# Patient Record
Sex: FEMALE | Race: OTHER | HISPANIC OR LATINO | Employment: UNEMPLOYED | URBAN - METROPOLITAN AREA
[De-identification: names, ages, dates, MRNs, and addresses within clinical notes are randomized per-mention and may not be internally consistent; named-entity substitution may affect disease eponyms.]

---

## 2018-01-15 ENCOUNTER — HOSPITAL ENCOUNTER (EMERGENCY)
Facility: HOSPITAL | Age: 33
Discharge: HOME | End: 2018-01-15
Attending: EMERGENCY MEDICINE
Payer: MEDICAID

## 2018-01-15 VITALS
WEIGHT: 168 LBS | HEIGHT: 63 IN | HEART RATE: 83 BPM | OXYGEN SATURATION: 100 % | TEMPERATURE: 98 F | SYSTOLIC BLOOD PRESSURE: 126 MMHG | RESPIRATION RATE: 20 BRPM | BODY MASS INDEX: 29.77 KG/M2 | DIASTOLIC BLOOD PRESSURE: 88 MMHG

## 2018-01-15 DIAGNOSIS — Z34.90 PREGNANCY, UNSPECIFIED GESTATIONAL AGE: ICD-10-CM

## 2018-01-15 DIAGNOSIS — J06.9 VIRAL URI: ICD-10-CM

## 2018-01-15 DIAGNOSIS — N30.00 ACUTE CYSTITIS WITHOUT HEMATURIA: Primary | ICD-10-CM

## 2018-01-15 LAB
B-HCG UR QL: POSITIVE
BACTERIA #/AREA URNS HPF: ABNORMAL /HPF
BILIRUB UR QL STRIP: NEGATIVE
CLARITY UR: ABNORMAL
COLOR UR: YELLOW
CTP QC/QA: YES
DEPRECATED S PYO AG THROAT QL EIA: NEGATIVE
FLUAV AG SPEC QL IA: NEGATIVE
FLUBV AG SPEC QL IA: NEGATIVE
GLUCOSE UR QL STRIP: NEGATIVE
HGB UR QL STRIP: ABNORMAL
KETONES UR QL STRIP: ABNORMAL
LEUKOCYTE ESTERASE UR QL STRIP: ABNORMAL
MICROSCOPIC COMMENT: ABNORMAL
NITRITE UR QL STRIP: NEGATIVE
PH UR STRIP: 6 [PH] (ref 5–8)
PROT UR QL STRIP: NEGATIVE
RBC #/AREA URNS HPF: 1 /HPF (ref 0–4)
SP GR UR STRIP: 1.02 (ref 1–1.03)
SPECIMEN SOURCE: NORMAL
SQUAMOUS #/AREA URNS HPF: 3 /HPF
URN SPEC COLLECT METH UR: ABNORMAL
UROBILINOGEN UR STRIP-ACNC: NEGATIVE EU/DL
WBC #/AREA URNS HPF: 5 /HPF (ref 0–5)

## 2018-01-15 PROCEDURE — 81025 URINE PREGNANCY TEST: CPT | Performed by: PHYSICIAN ASSISTANT

## 2018-01-15 PROCEDURE — 81000 URINALYSIS NONAUTO W/SCOPE: CPT

## 2018-01-15 PROCEDURE — 87400 INFLUENZA A/B EACH AG IA: CPT | Mod: 59

## 2018-01-15 PROCEDURE — 87880 STREP A ASSAY W/OPTIC: CPT

## 2018-01-15 PROCEDURE — 87147 CULTURE TYPE IMMUNOLOGIC: CPT

## 2018-01-15 PROCEDURE — 87081 CULTURE SCREEN ONLY: CPT

## 2018-01-15 PROCEDURE — 99283 EMERGENCY DEPT VISIT LOW MDM: CPT

## 2018-01-15 PROCEDURE — 87086 URINE CULTURE/COLONY COUNT: CPT

## 2018-01-15 PROCEDURE — 25000003 PHARM REV CODE 250: Performed by: PHYSICIAN ASSISTANT

## 2018-01-15 RX ORDER — NITROFURANTOIN 25; 75 MG/1; MG/1
100 CAPSULE ORAL 2 TIMES DAILY
Qty: 10 CAPSULE | Refills: 0 | Status: SHIPPED | OUTPATIENT
Start: 2018-01-15 | End: 2018-01-20

## 2018-01-15 RX ORDER — ACETAMINOPHEN 500 MG
1000 TABLET ORAL
Status: COMPLETED | OUTPATIENT
Start: 2018-01-15 | End: 2018-01-15

## 2018-01-15 RX ADMIN — ACETAMINOPHEN 1000 MG: 500 TABLET, FILM COATED ORAL at 05:01

## 2018-01-15 NOTE — ED PROVIDER NOTES
Encounter Date: 1/15/2018    SCRIBE #1 NOTE: IGabbie, am scribing for, and in the presence of, Radha Stallings PA-C.       History     Chief Complaint   Patient presents with    Fever     3 months pregnant     Cough    Chills    Sore Throat       01/15/2018 4:57 PM     Chief complaint: Fever, back pain      Tresa Cavazos is a 32 y.o. female with a history of miscarraige who presents to the ED complaining of left-sided back pain that radiates to her left side x4 days. She also complains of cough, rhinorrhea, congestion, sore throat, chills, and fever x4 days. She denies vaginal bleeding, vaginal discharge, dysuria, and all other symptoms at this time. The patient is currently about 2 months pregnant, and she has not taken any medication to treat her symptoms. She has a history of A3. There is no SHx noted.       The history is provided by a relative. A  was used.     Review of patient's allergies indicates:  No Known Allergies  History reviewed. No pertinent past medical history.  No past surgical history on file.  History reviewed. No pertinent family history.  Social History   Substance Use Topics    Smoking status: Not on file    Smokeless tobacco: Not on file    Alcohol use Not on file     Review of Systems   Constitutional: Positive for chills and fever.   HENT: Positive for congestion, rhinorrhea and sore throat. Negative for facial swelling and trouble swallowing.    Eyes: Negative for discharge.   Respiratory: Positive for cough. Negative for chest tightness, shortness of breath and wheezing.    Cardiovascular: Negative for chest pain and palpitations.   Gastrointestinal: Negative for abdominal pain, diarrhea, nausea and vomiting.   Genitourinary: Negative for dysuria, hematuria, vaginal bleeding, vaginal discharge and vaginal pain.   Musculoskeletal: Positive for back pain. Negative for arthralgias, joint swelling, myalgias, neck pain and neck stiffness.   Skin:  Negative for color change, pallor, rash and wound.   Neurological: Negative for dizziness, syncope, weakness, light-headedness, numbness and headaches.   Hematological: Does not bruise/bleed easily.   Psychiatric/Behavioral: The patient is not nervous/anxious.        Physical Exam     Initial Vitals [01/15/18 1636]   BP Pulse Resp Temp SpO2   126/88 83 20 98 °F (36.7 °C) 100 %      MAP       100.67         Physical Exam    Nursing note and vitals reviewed.  Constitutional: She appears well-developed and well-nourished. She is not diaphoretic. No distress.   HENT:   Head: Normocephalic and atraumatic.   Right Ear: External ear normal.   Left Ear: External ear normal.   Nasal congestion and rhinorrhea noted.  Mild erythema noted to posterior oropharynx without edema or exudate.    Eyes: Conjunctivae are normal.   Neck: Normal range of motion. Neck supple.   Cardiovascular: Normal rate, regular rhythm, normal heart sounds and intact distal pulses.   No murmur heard.  Pulmonary/Chest: Breath sounds normal. No respiratory distress. She has no wheezes. She has no rhonchi. She has no rales.   Equal, bilateral breath sounds noted without wheezing.      Abdominal: Soft. She exhibits no distension and no mass. There is no tenderness.   No palpable abdominal tenderness noted. No CVA tenderness.    Musculoskeletal: Normal range of motion. She exhibits no edema or tenderness.   No palpable tenderness to lumbar spine.     Lymphadenopathy:     She has no cervical adenopathy.   Neurological: She is alert and oriented to person, place, and time. She has normal strength. No sensory deficit.   Skin: Skin is warm and dry. No rash and no abscess noted. No erythema.   Psychiatric: She has a normal mood and affect.         ED Course   Procedures  Labs Reviewed   URINALYSIS - Abnormal; Notable for the following:        Result Value    Appearance, UA Hazy (*)     Ketones, UA 1+ (*)     Occult Blood UA Trace (*)     Leukocytes, UA Trace (*)      All other components within normal limits   URINALYSIS MICROSCOPIC - Abnormal; Notable for the following:     Bacteria, UA Moderate (*)     All other components within normal limits   POCT URINE PREGNANCY - Abnormal; Notable for the following:     POC Preg Test, Ur Positive (*)     All other components within normal limits   THROAT SCREEN, RAPID   CULTURE, STREP A,  THROAT   CULTURE, URINE   INFLUENZA A AND B ANTIGEN             Medical Decision Making:   History:   Old Medical Records: I decided to obtain old medical records.  Differential Diagnosis:   Influenza  Pneumonia  Strep pharyngitis  Meningitis  Viral syndrome  Miscarriage  UTI    Clinical Tests:   Lab Tests: Ordered and Reviewed       APC / Resident Notes:   Influenza and Rapid strep are negative.  Urinalysis does show evidence for infection, which we will treat with Macrobid.  Urine culture will be sent.  She repeatedly denies vaginal discharge and vaginal bleeding.  No abdominal pain, pelvic pain or cramping.  No need for further evaluation or testing at this time.  She is encouraged to follow-up with her OB/GYN.  Her other symptoms are likely viral.  She is discharged home and given specific return precautions.        Scribe Attestation:   Scribe #1: I performed the above scribed service and the documentation accurately describes the services I performed. I attest to the accuracy of the note.    I, Radha Stallings PA-C, personally performed the services described in this documentation. All medical record entries made by the scribe were at my direction and in my presence.  I have reviewed the chart and agree that the record reflects my personal performance and is accurate and complete. Radha Stallings PA-C.  1:14 AM 01/16/2018          ED Course      Clinical Impression:   The primary encounter diagnosis was Acute cystitis without hematuria. Diagnoses of Viral URI and Pregnancy, unspecified gestational age were also pertinent to this  visit.    Disposition:   Disposition: Discharged  Condition: Stable                        Radha Stallings PA-C  01/16/18 0114

## 2018-01-16 NOTE — ED NOTES
Pt presents to ED with c/o left lower back pain that radiates around her side, cough, congestion, fever, rhinorrhea, and generalized body aches that began four days ago. Pt is AAOx4. Skin warm, dry to touch. Respirations even, nonlabored. NAD noted. VSS. Breath sounds clear, equal bilaterally. Mucous membranes pink, moist. Pt denies vaginal bleeding or vaginal discharge. Denies pain at this time. Pt is 3 months pregnant.

## 2018-01-17 LAB — BACTERIA UR CULT: NORMAL

## 2018-01-18 LAB
BACTERIA THROAT CULT: NORMAL
BACTERIA THROAT CULT: NORMAL

## 2019-07-29 ENCOUNTER — LAB VISIT (OUTPATIENT)
Dept: LAB | Facility: HOSPITAL | Age: 34
End: 2019-07-29
Attending: SPECIALIST
Payer: MEDICAID

## 2019-07-29 DIAGNOSIS — Z34.81 PRENATAL CARE, SUBSEQUENT PREGNANCY, FIRST TRIMESTER: Primary | ICD-10-CM

## 2019-07-29 LAB
BASOPHILS # BLD AUTO: 0.03 K/UL (ref 0–0.2)
BASOPHILS NFR BLD: 0.3 % (ref 0–1.9)
DIFFERENTIAL METHOD: ABNORMAL
EOSINOPHIL # BLD AUTO: 0.4 K/UL (ref 0–0.5)
EOSINOPHIL NFR BLD: 4.3 % (ref 0–8)
ERYTHROCYTE [DISTWIDTH] IN BLOOD BY AUTOMATED COUNT: 13.4 % (ref 11.5–14.5)
HCG INTACT+B SERPL-ACNC: NORMAL MIU/ML
HCT VFR BLD AUTO: 36.1 % (ref 37–48.5)
HGB BLD-MCNC: 12 G/DL (ref 12–16)
IMM GRANULOCYTES # BLD AUTO: 0.01 K/UL (ref 0–0.04)
LYMPHOCYTES # BLD AUTO: 2.8 K/UL (ref 1–4.8)
LYMPHOCYTES NFR BLD: 30 % (ref 18–48)
MCH RBC QN AUTO: 28.6 PG (ref 27–31)
MCHC RBC AUTO-ENTMCNC: 33.2 G/DL (ref 32–36)
MCV RBC AUTO: 86 FL (ref 82–98)
MONOCYTES # BLD AUTO: 0.5 K/UL (ref 0.3–1)
MONOCYTES NFR BLD: 5.4 % (ref 4–15)
NEUTROPHILS # BLD AUTO: 5.5 K/UL (ref 1.8–7.7)
NEUTROPHILS NFR BLD: 59.9 % (ref 38–73)
NRBC BLD-RTO: 0 /100 WBC
PLATELET # BLD AUTO: 266 K/UL (ref 150–350)
PMV BLD AUTO: 9.5 FL (ref 9.2–12.9)
RBC # BLD AUTO: 4.2 M/UL (ref 4–5.4)
WBC # BLD AUTO: 9.22 K/UL (ref 3.9–12.7)

## 2019-07-29 PROCEDURE — 84702 CHORIONIC GONADOTROPIN TEST: CPT

## 2019-07-29 PROCEDURE — 36415 COLL VENOUS BLD VENIPUNCTURE: CPT

## 2019-07-29 PROCEDURE — 84144 ASSAY OF PROGESTERONE: CPT

## 2019-07-29 PROCEDURE — 85025 COMPLETE CBC W/AUTO DIFF WBC: CPT

## 2019-07-30 LAB — PROGEST SERPL-MCNC: 29.2 NG/ML

## 2019-08-01 LAB
ABO + RH BLD: NORMAL
HBV SURFACE AG SERPL QL IA: NEGATIVE
HCT VFR BLD AUTO: 36 % (ref 36–46)
HGB BLD-MCNC: 12 G/DL (ref 12–16)
HIV 1+2 AB+HIV1 P24 AG SERPL QL IA: NORMAL
INDIRECT COOMBS: NORMAL
RPR: NORMAL
RUBELLA IMMUNE STATUS: NORMAL
TSH SERPL DL<=0.005 MIU/L-ACNC: 2.26 UIU/ML (ref 0.41–5.9)

## 2019-08-29 LAB
C TRACH RRNA SPEC QL PROBE: NORMAL
N GONORRHOEAE, AMPLIFIED DNA: NORMAL

## 2019-12-27 LAB — PRENATAL STREP B CULTURE: NORMAL

## 2020-01-16 ENCOUNTER — HOSPITAL ENCOUNTER (INPATIENT)
Facility: HOSPITAL | Age: 35
LOS: 2 days | Discharge: HOME | End: 2020-01-18
Attending: SPECIALIST | Admitting: SPECIALIST
Payer: MEDICAID

## 2020-01-16 ENCOUNTER — ANESTHESIA EVENT (OUTPATIENT)
Dept: OBSTETRICS AND GYNECOLOGY | Facility: HOSPITAL | Age: 35
End: 2020-01-16
Payer: MEDICAID

## 2020-01-16 ENCOUNTER — ANESTHESIA (OUTPATIENT)
Dept: OBSTETRICS AND GYNECOLOGY | Facility: HOSPITAL | Age: 35
End: 2020-01-16
Payer: MEDICAID

## 2020-01-16 DIAGNOSIS — Z34.90 ENCOUNTER FOR INDUCTION OF LABOR: ICD-10-CM

## 2020-01-16 LAB
ABO + RH BLD: NORMAL
ALBUMIN SERPL BCP-MCNC: 3 G/DL (ref 3.5–5.2)
ALP SERPL-CCNC: 112 U/L (ref 55–135)
ALT SERPL W/O P-5'-P-CCNC: 13 U/L (ref 10–44)
AMPHET+METHAMPHET UR QL: NEGATIVE
ANION GAP SERPL CALC-SCNC: 12 MMOL/L (ref 8–16)
AST SERPL-CCNC: 24 U/L (ref 10–40)
BACTERIA #/AREA URNS HPF: ABNORMAL /HPF
BARBITURATES UR QL SCN>200 NG/ML: NEGATIVE
BASOPHILS # BLD AUTO: 0.03 K/UL (ref 0–0.2)
BASOPHILS NFR BLD: 0.3 % (ref 0–1.9)
BENZODIAZ UR QL SCN>200 NG/ML: NEGATIVE
BILIRUB SERPL-MCNC: 0.6 MG/DL (ref 0.1–1)
BILIRUB UR QL STRIP: NEGATIVE
BLD GP AB SCN CELLS X3 SERPL QL: NORMAL
BUN SERPL-MCNC: 10 MG/DL (ref 6–20)
BZE UR QL SCN: NEGATIVE
CALCIUM SERPL-MCNC: 9.4 MG/DL (ref 8.7–10.5)
CANNABINOIDS UR QL SCN: NEGATIVE
CHLORIDE SERPL-SCNC: 105 MMOL/L (ref 95–110)
CLARITY UR: CLEAR
CO2 SERPL-SCNC: 19 MMOL/L (ref 23–29)
COLOR UR: YELLOW
CREAT SERPL-MCNC: 0.4 MG/DL (ref 0.5–1.4)
CREAT UR-MCNC: 185 MG/DL (ref 15–325)
DIFFERENTIAL METHOD: NORMAL
EOSINOPHIL # BLD AUTO: 0.2 K/UL (ref 0–0.5)
EOSINOPHIL NFR BLD: 2.3 % (ref 0–8)
ERYTHROCYTE [DISTWIDTH] IN BLOOD BY AUTOMATED COUNT: 13.1 % (ref 11.5–14.5)
EST. GFR  (AFRICAN AMERICAN): >60 ML/MIN/1.73 M^2
EST. GFR  (NON AFRICAN AMERICAN): >60 ML/MIN/1.73 M^2
GLUCOSE SERPL-MCNC: 87 MG/DL (ref 70–110)
GLUCOSE UR QL STRIP: NEGATIVE
HCT VFR BLD AUTO: 37.4 % (ref 37–48.5)
HGB BLD-MCNC: 12.7 G/DL (ref 12–16)
HGB UR QL STRIP: NEGATIVE
IMM GRANULOCYTES # BLD AUTO: 0.02 K/UL (ref 0–0.04)
IMM GRANULOCYTES NFR BLD AUTO: 0.2 % (ref 0–0.5)
KETONES UR QL STRIP: NEGATIVE
LEUKOCYTE ESTERASE UR QL STRIP: NEGATIVE
LYMPHOCYTES # BLD AUTO: 2.8 K/UL (ref 1–4.8)
LYMPHOCYTES NFR BLD: 32 % (ref 18–48)
MCH RBC QN AUTO: 30 PG (ref 27–31)
MCHC RBC AUTO-ENTMCNC: 34 G/DL (ref 32–36)
MCV RBC AUTO: 88 FL (ref 82–98)
MICROSCOPIC COMMENT: ABNORMAL
MONOCYTES # BLD AUTO: 0.6 K/UL (ref 0.3–1)
MONOCYTES NFR BLD: 6.3 % (ref 4–15)
NEUTROPHILS # BLD AUTO: 5.1 K/UL (ref 1.8–7.7)
NEUTROPHILS NFR BLD: 58.9 % (ref 38–73)
NITRITE UR QL STRIP: NEGATIVE
NRBC BLD-RTO: 0 /100 WBC
OPIATES UR QL SCN: NEGATIVE
PCP UR QL SCN>25 NG/ML: NEGATIVE
PCP UR QL SCN>25 NG/ML: NEGATIVE
PH UR STRIP: 7 [PH] (ref 5–8)
PLATELET # BLD AUTO: 226 K/UL (ref 150–350)
PMV BLD AUTO: 10.3 FL (ref 9.2–12.9)
POTASSIUM SERPL-SCNC: 3.5 MMOL/L (ref 3.5–5.1)
PROT SERPL-MCNC: 6.8 G/DL (ref 6–8.4)
PROT UR QL STRIP: ABNORMAL
RBC # BLD AUTO: 4.23 M/UL (ref 4–5.4)
RBC #/AREA URNS HPF: 3 /HPF (ref 0–4)
RPR SER QL: NORMAL
SODIUM SERPL-SCNC: 136 MMOL/L (ref 136–145)
SP GR UR STRIP: 1.02 (ref 1–1.03)
SQUAMOUS #/AREA URNS HPF: 50 /HPF
TOXICOLOGY INFORMATION: NORMAL
URN SPEC COLLECT METH UR: ABNORMAL
UROBILINOGEN UR STRIP-ACNC: 1 EU/DL
WBC # BLD AUTO: 8.73 K/UL (ref 3.9–12.7)
WBC #/AREA URNS HPF: 16 /HPF (ref 0–5)

## 2020-01-16 PROCEDURE — C1751 CATH, INF, PER/CENT/MIDLINE: HCPCS | Performed by: ANESTHESIOLOGY

## 2020-01-16 PROCEDURE — 80307 DRUG TEST PRSMV CHEM ANLYZR: CPT

## 2020-01-16 PROCEDURE — 80053 COMPREHEN METABOLIC PANEL: CPT

## 2020-01-16 PROCEDURE — 86901 BLOOD TYPING SEROLOGIC RH(D): CPT

## 2020-01-16 PROCEDURE — 12000002 HC ACUTE/MED SURGE SEMI-PRIVATE ROOM

## 2020-01-16 PROCEDURE — 87086 URINE CULTURE/COLONY COUNT: CPT

## 2020-01-16 PROCEDURE — 81001 URINALYSIS AUTO W/SCOPE: CPT

## 2020-01-16 PROCEDURE — 72200004 HC VAGINAL DELIVERY LEVEL I

## 2020-01-16 PROCEDURE — 63600175 PHARM REV CODE 636 W HCPCS: Performed by: ANESTHESIOLOGY

## 2020-01-16 PROCEDURE — 85025 COMPLETE CBC W/AUTO DIFF WBC: CPT

## 2020-01-16 PROCEDURE — 63600175 PHARM REV CODE 636 W HCPCS: Performed by: SPECIALIST

## 2020-01-16 PROCEDURE — 62326 NJX INTERLAMINAR LMBR/SAC: CPT | Performed by: ANESTHESIOLOGY

## 2020-01-16 PROCEDURE — 86592 SYPHILIS TEST NON-TREP QUAL: CPT

## 2020-01-16 RX ORDER — SIMETHICONE 80 MG
1 TABLET,CHEWABLE ORAL 4 TIMES DAILY PRN
Status: DISCONTINUED | OUTPATIENT
Start: 2020-01-16 | End: 2020-01-18 | Stop reason: HOSPADM

## 2020-01-16 RX ORDER — BUTORPHANOL TARTRATE 2 MG/ML
1 INJECTION INTRAMUSCULAR; INTRAVENOUS
Status: DISCONTINUED | OUTPATIENT
Start: 2020-01-16 | End: 2020-01-18 | Stop reason: HOSPADM

## 2020-01-16 RX ORDER — FENTANYL/BUPIVACAINE/NS/PF 2-625MCG/1
PLASTIC BAG, INJECTION (ML) INJECTION CONTINUOUS
Status: CANCELLED | OUTPATIENT
Start: 2020-01-16

## 2020-01-16 RX ORDER — OXYCODONE AND ACETAMINOPHEN 10; 325 MG/1; MG/1
1 TABLET ORAL EVERY 4 HOURS PRN
Status: DISCONTINUED | OUTPATIENT
Start: 2020-01-16 | End: 2020-01-18 | Stop reason: HOSPADM

## 2020-01-16 RX ORDER — PROMETHAZINE HYDROCHLORIDE 25 MG/1
25 TABLET ORAL EVERY 6 HOURS PRN
Status: CANCELLED | OUTPATIENT
Start: 2020-01-16

## 2020-01-16 RX ORDER — BUTORPHANOL TARTRATE 2 MG/ML
2 INJECTION INTRAMUSCULAR; INTRAVENOUS
Status: DISCONTINUED | OUTPATIENT
Start: 2020-01-16 | End: 2020-01-18 | Stop reason: HOSPADM

## 2020-01-16 RX ORDER — OXYCODONE AND ACETAMINOPHEN 5; 325 MG/1; MG/1
1 TABLET ORAL EVERY 4 HOURS PRN
Status: DISCONTINUED | OUTPATIENT
Start: 2020-01-16 | End: 2020-01-18 | Stop reason: HOSPADM

## 2020-01-16 RX ORDER — OXYTOCIN-SODIUM CHLORIDE 0.9% IV SOLN 30 UNIT/500ML 30-0.9/5 UT/ML-%
2 SOLUTION INTRAVENOUS CONTINUOUS
Status: DISCONTINUED | OUTPATIENT
Start: 2020-01-16 | End: 2020-01-18 | Stop reason: HOSPADM

## 2020-01-16 RX ORDER — SODIUM CHLORIDE, SODIUM LACTATE, POTASSIUM CHLORIDE, CALCIUM CHLORIDE 600; 310; 30; 20 MG/100ML; MG/100ML; MG/100ML; MG/100ML
INJECTION, SOLUTION INTRAVENOUS CONTINUOUS
Status: DISCONTINUED | OUTPATIENT
Start: 2020-01-16 | End: 2020-01-18 | Stop reason: HOSPADM

## 2020-01-16 RX ORDER — DIPHENHYDRAMINE HYDROCHLORIDE 50 MG/ML
12.5 INJECTION INTRAMUSCULAR; INTRAVENOUS EVERY 4 HOURS PRN
Status: CANCELLED | OUTPATIENT
Start: 2020-01-16

## 2020-01-16 RX ORDER — ROPIVACAINE HYDROCHLORIDE 2 MG/ML
INJECTION, SOLUTION EPIDURAL; INFILTRATION
Status: DISCONTINUED | OUTPATIENT
Start: 2020-01-16 | End: 2020-01-16

## 2020-01-16 RX ORDER — MISOPROSTOL 100 UG/1
600 TABLET ORAL
Status: DISCONTINUED | OUTPATIENT
Start: 2020-01-16 | End: 2020-01-18 | Stop reason: HOSPADM

## 2020-01-16 RX ORDER — ONDANSETRON 2 MG/ML
4 INJECTION INTRAMUSCULAR; INTRAVENOUS ONCE
Status: CANCELLED | OUTPATIENT
Start: 2020-01-16 | End: 2020-01-16

## 2020-01-16 RX ORDER — OXYTOCIN-SODIUM CHLORIDE 0.9% IV SOLN 30 UNIT/500ML 30-0.9/5 UT/ML-%
30 SOLUTION INTRAVENOUS ONCE
Status: DISCONTINUED | OUTPATIENT
Start: 2020-01-16 | End: 2020-01-18 | Stop reason: HOSPADM

## 2020-01-16 RX ORDER — EPHEDRINE SULFATE 50 MG/ML
5 INJECTION, SOLUTION INTRAVENOUS ONCE AS NEEDED
Status: CANCELLED | OUTPATIENT
Start: 2020-01-16 | End: 2031-06-14

## 2020-01-16 RX ORDER — ONDANSETRON 4 MG/1
8 TABLET, ORALLY DISINTEGRATING ORAL EVERY 8 HOURS PRN
Status: DISCONTINUED | OUTPATIENT
Start: 2020-01-16 | End: 2020-01-18 | Stop reason: HOSPADM

## 2020-01-16 RX ORDER — CALCIUM CARBONATE 200(500)MG
500 TABLET,CHEWABLE ORAL 3 TIMES DAILY PRN
Status: DISCONTINUED | OUTPATIENT
Start: 2020-01-16 | End: 2020-01-18 | Stop reason: HOSPADM

## 2020-01-16 RX ORDER — NALOXONE HCL 0.4 MG/ML
0.4 VIAL (ML) INJECTION SEE ADMIN INSTRUCTIONS
Status: CANCELLED | OUTPATIENT
Start: 2020-01-16

## 2020-01-16 RX ORDER — SODIUM CHLORIDE 9 MG/ML
INJECTION, SOLUTION INTRAVENOUS
Status: DISCONTINUED | OUTPATIENT
Start: 2020-01-16 | End: 2020-01-18 | Stop reason: HOSPADM

## 2020-01-16 RX ORDER — ONDANSETRON 4 MG/1
8 TABLET, ORALLY DISINTEGRATING ORAL EVERY 8 HOURS PRN
Status: CANCELLED | OUTPATIENT
Start: 2020-01-16

## 2020-01-16 RX ADMIN — ROPIVACAINE HYDROCHLORIDE 3 ML: 2 INJECTION, SOLUTION EPIDURAL; INFILTRATION at 08:01

## 2020-01-16 RX ADMIN — ROPIVACAINE HYDROCHLORIDE 4 ML: 2 INJECTION, SOLUTION EPIDURAL; INFILTRATION at 08:01

## 2020-01-16 RX ADMIN — SODIUM CHLORIDE, SODIUM LACTATE, POTASSIUM CHLORIDE, AND CALCIUM CHLORIDE: .6; .31; .03; .02 INJECTION, SOLUTION INTRAVENOUS at 06:01

## 2020-01-16 RX ADMIN — ROPIVACAINE HYDROCHLORIDE 3 ML: 2 INJECTION, SOLUTION EPIDURAL; INFILTRATION at 07:01

## 2020-01-16 RX ADMIN — Medication 1 MILLI-UNITS/MIN: at 07:01

## 2020-01-16 RX ADMIN — SODIUM CHLORIDE, SODIUM LACTATE, POTASSIUM CHLORIDE, AND CALCIUM CHLORIDE: .6; .31; .03; .02 INJECTION, SOLUTION INTRAVENOUS at 02:01

## 2020-01-16 NOTE — PLAN OF CARE
Plan of care reviewed with patient and family. Patient again states her desire to have a natural delivery. No questions at this time.

## 2020-01-17 LAB
BACTERIA UR CULT: NORMAL
BACTERIA UR CULT: NORMAL
BASOPHILS # BLD AUTO: 0.05 K/UL (ref 0–0.2)
BASOPHILS NFR BLD: 0.4 % (ref 0–1.9)
DIFFERENTIAL METHOD: ABNORMAL
EOSINOPHIL # BLD AUTO: 0.1 K/UL (ref 0–0.5)
EOSINOPHIL NFR BLD: 0.4 % (ref 0–8)
ERYTHROCYTE [DISTWIDTH] IN BLOOD BY AUTOMATED COUNT: 13.1 % (ref 11.5–14.5)
HCT VFR BLD AUTO: 35.6 % (ref 37–48.5)
HGB BLD-MCNC: 11.7 G/DL (ref 12–16)
IMM GRANULOCYTES # BLD AUTO: 0.05 K/UL (ref 0–0.04)
IMM GRANULOCYTES NFR BLD AUTO: 0.4 % (ref 0–0.5)
LYMPHOCYTES # BLD AUTO: 2.3 K/UL (ref 1–4.8)
LYMPHOCYTES NFR BLD: 17.6 % (ref 18–48)
MCH RBC QN AUTO: 29.2 PG (ref 27–31)
MCHC RBC AUTO-ENTMCNC: 32.9 G/DL (ref 32–36)
MCV RBC AUTO: 89 FL (ref 82–98)
MONOCYTES # BLD AUTO: 0.8 K/UL (ref 0.3–1)
MONOCYTES NFR BLD: 6.5 % (ref 4–15)
NEUTROPHILS # BLD AUTO: 9.6 K/UL (ref 1.8–7.7)
NEUTROPHILS NFR BLD: 74.7 % (ref 38–73)
NRBC BLD-RTO: 0 /100 WBC
PLATELET # BLD AUTO: 215 K/UL (ref 150–350)
PMV BLD AUTO: 10.6 FL (ref 9.2–12.9)
RBC # BLD AUTO: 4.01 M/UL (ref 4–5.4)
WBC # BLD AUTO: 12.82 K/UL (ref 3.9–12.7)

## 2020-01-17 PROCEDURE — 85025 COMPLETE CBC W/AUTO DIFF WBC: CPT

## 2020-01-17 PROCEDURE — 63600175 PHARM REV CODE 636 W HCPCS: Performed by: SPECIALIST

## 2020-01-17 PROCEDURE — 36415 COLL VENOUS BLD VENIPUNCTURE: CPT

## 2020-01-17 PROCEDURE — 12000002 HC ACUTE/MED SURGE SEMI-PRIVATE ROOM

## 2020-01-17 PROCEDURE — 25000003 PHARM REV CODE 250: Performed by: SPECIALIST

## 2020-01-17 RX ORDER — DIPHENHYDRAMINE HCL 25 MG
25 CAPSULE ORAL EVERY 4 HOURS PRN
Status: DISCONTINUED | OUTPATIENT
Start: 2020-01-17 | End: 2020-01-18 | Stop reason: HOSPADM

## 2020-01-17 RX ORDER — HYDROCORTISONE 25 MG/G
CREAM TOPICAL 3 TIMES DAILY PRN
Status: DISCONTINUED | OUTPATIENT
Start: 2020-01-17 | End: 2020-01-18 | Stop reason: HOSPADM

## 2020-01-17 RX ORDER — ADHESIVE BANDAGE
30 BANDAGE TOPICAL 2 TIMES DAILY PRN
Status: DISCONTINUED | OUTPATIENT
Start: 2020-01-17 | End: 2020-01-18 | Stop reason: HOSPADM

## 2020-01-17 RX ORDER — DOCUSATE SODIUM 100 MG/1
200 CAPSULE, LIQUID FILLED ORAL 2 TIMES DAILY PRN
Status: DISCONTINUED | OUTPATIENT
Start: 2020-01-17 | End: 2020-01-18 | Stop reason: HOSPADM

## 2020-01-17 RX ORDER — DIPHENHYDRAMINE HYDROCHLORIDE 50 MG/ML
25 INJECTION INTRAMUSCULAR; INTRAVENOUS EVERY 4 HOURS PRN
Status: DISCONTINUED | OUTPATIENT
Start: 2020-01-17 | End: 2020-01-18 | Stop reason: HOSPADM

## 2020-01-17 RX ADMIN — MAGNESIUM HYDROXIDE 2400 MG: 400 SUSPENSION ORAL at 11:01

## 2020-01-17 RX ADMIN — BENZOCAINE AND LEVOMENTHOL: 200; 5 SPRAY TOPICAL at 01:01

## 2020-01-17 RX ADMIN — DOCUSATE SODIUM 200 MG: 100 CAPSULE, LIQUID FILLED ORAL at 06:01

## 2020-01-17 NOTE — LACTATION NOTE
"Talked to patient through . States, "she is feeding well from me". History of nursing 2 sons previously. Requested lanolin to keep her nipples from "getting dried or cracked". Denies any pain/discomfort/questions/concerns at present. Discussed feeding patterns including cluster feeding for newborns. V/u. Offered assist as needed/desired with feedings. V/u. Will f/u prn.   "

## 2020-01-17 NOTE — L&D DELIVERY NOTE
[facility]  Vaginal Delivery Note    SUMMARY     Patient underwent normal spontaneous vaginal delivery over an intact perineum.  The placenta was delivered intact and spontaneous.  The uterus and vagina were swept clean. The patient had an epidural for anesthesia . The estimated blood loss was 150 cc.  No other complications were noted. Baby delivered was a female infant with Apgar 8 9. .  Patient tolerated delivery well. Sponge needle and lap counted correctly x2. Both mother and baby are in stable condition.        Indications: Vaginal delivery  Pregnancy complicated by:   Patient Active Problem List   Diagnosis    Encounter for induction of labor    Vaginal delivery     Admitting GA: 39w4d

## 2020-01-17 NOTE — ANESTHESIA PROCEDURE NOTES
Epidural    Patient location during procedure: OB   Reason for block: primary anesthetic   Diagnosis: Intrauterine pregnancy   Start time: 1/16/2020 7:55 PM  Timeout: 1/16/2020 7:54 PM  End time: 1/16/2020 8:05 PM    Staffing  Performing Provider: Jorge Leija Jr., MD  Authorizing Provider: Jorge Leija Jr., MD        Preanesthetic Checklist  Completed: patient identified, site marked, surgical consent, pre-op evaluation, timeout performed, IV checked, risks and benefits discussed, monitors and equipment checked, anesthesia consent given, hand hygiene performed and patient being monitored  Preparation  Patient position: sitting  Prep: Betadine and ChloraPrep  Patient monitoring: ECG and Blood Pressure  Epidural  Skin Anesthetic: lidocaine 1%  Skin Wheal: 3 mL  Administration type: continuous  Approach: midline  Interspace: L3-4    Injection technique: PASCUAL air  Needle and Epidural Catheter  Needle type: Tuohy   Needle gauge: 17  Needle length: 3.5 inches  Catheter type: springwound and multi-orifice  Catheter size: 19 G  Test dose: 3 mL of lidocaine 1.5% with Epi 1-to-200,000  Additional Documentation: incremental injection, negative aspiration for heme and CSF, no paresthesia on injection, no significant pain on injection, no significant complaints from patient and no signs/symptoms of IV or SA injection  Needle localization: anatomical landmarks  Assessment  Upper dermatomal levels - Left: T6  Right: T6   Dermatomal levels determined by pinch or prick  Ease of block: easy  Patient's tolerance of the procedure: no complaints and comfortable throughout blockNo inadvertent dural puncture with Tuohy.  Dural puncture not performed with spinal needle

## 2020-01-17 NOTE — NURSING TRANSFER
Nursing Transfer Note      1/17/2020     Transfer To: 2103    Transfer via wheelchair    Transfer with belongings and support friends.    Transported by w/c by Donal ZAVALETA and family friends.    Medicines sent: N/A    Chart send with patient: Yes    Notified: friend aware of new room assignment and friend will stay the night with pt tonight.    Patient reassessed at: 0055 1/17/2020 at bedside with Osman Benitez RN to include fundal/vaginal bleeding, wnl.    Upon arrival to floor: patient oriented to room, call bell in reach and bed in lowest position

## 2020-01-17 NOTE — PLAN OF CARE
Patient resting in bed. Family at the bedside. Vitals are stable. Patient shows no s/s of distress or pain. Patient informed to call nurse for any needs. Will continue to monitor.

## 2020-01-17 NOTE — ANESTHESIA PREPROCEDURE EVALUATION
01/16/2020  Tresa Cavazos is a 34 y.o., female.    Anesthesia Evaluation    I have reviewed the Patient Summary Reports.    I have reviewed the Nursing Notes.   I have reviewed the Medications.     Review of Systems  Anesthesia Hx:  No problems with previous Anesthesia Denies Hx of Anesthetic complications  Neg history of prior surgery. Denies Family Hx of Anesthesia complications.   Denies Personal Hx of Anesthesia complications.   Social:  No Alcohol Use, Non-Smoker    Hematology/Oncology:  Hematology Normal   Oncology Normal     EENT/Dental:EENT/Dental Normal   Cardiovascular:  Cardiovascular Normal     Pulmonary:  Pulmonary Normal    Renal/:  Renal/ Normal     Hepatic/GI:  Hepatic/GI Normal    Musculoskeletal:  Musculoskeletal Normal    Neurological:  Neurology Normal    Endocrine:  Endocrine Normal    Dermatological:  Skin Normal    Psych:  Psychiatric Normal           Physical Exam  General:  Well nourished    Airway/Jaw/Neck:  Airway Findings: Mouth Opening: Normal Tongue: Normal  General Airway Assessment: Adult  Mallampati: II  Improves to II with phonation.  TM Distance: Normal, at least 6 cm  Jaw/Neck Findings:  Neck ROM: Normal ROM       Chest/Lungs:  Chest/Lungs Findings: Clear to auscultation, Normal Respiratory Rate     Heart/Vascular:  Heart Findings: Rate: Normal  Rhythm: Regular Rhythm  Sounds: Normal        Mental Status:  Mental Status Findings:  Cooperative, Alert and Oriented         Anesthesia Plan  Type of Anesthesia, risks & benefits discussed:  Anesthesia Type:  epidural  Patient's Preference:   Intra-op Monitoring Plan: standard ASA monitors  Intra-op Monitoring Plan Comments:   Post Op Pain Control Plan:   Post Op Pain Control Plan Comments:   Induction:    Beta Blocker:  Patient is not currently on a Beta-Blocker (No further documentation required).       Informed  Consent: Patient understands risks and agrees with Anesthesia plan.  Questions answered. Anesthesia consent signed with patient.  ASA Score: 2     Day of Surgery Review of History & Physical:            Ready For Surgery From Anesthesia Perspective.

## 2020-01-17 NOTE — ANESTHESIA POSTPROCEDURE EVALUATION
Anesthesia Post Evaluation    Patient: Tresa Cavazos    Procedure(s) Performed: * No procedures listed *    Final Anesthesia Type: epidural    Patient location during evaluation: floor  Patient participation: Yes- Able to Participate  Level of consciousness: awake and alert  Post-procedure vital signs: reviewed and stable  Pain management: adequate  Airway patency: patent    PONV status at discharge: No PONV  Anesthetic complications: no      Cardiovascular status: stable  Respiratory status: unassisted  Hydration status: euvolemic  Follow-up not needed.    Both lower extremities back to normal from labor epidural.  Ambulating well. No back pain. No anesthesia complications. Patient has a mild, non postural headache, which is typical for her.  No anesthesia complications.        Vitals Value Taken Time   /69 1/17/2020  4:00 AM   Temp 36.9 °C (98.4 °F) 1/17/2020  4:00 AM   Pulse 85 1/17/2020  4:00 AM   Resp 18 1/17/2020  4:00 AM   SpO2 100 % 1/17/2020  4:00 AM         No case tracking events are documented in the log.      Pain/Federico Score: Pain Rating Prior to Med Admin: 6 (1/16/2020  8:31 PM)  Pain Rating Post Med Admin: 0 (1/16/2020  8:31 PM)

## 2020-01-18 VITALS
WEIGHT: 174 LBS | HEART RATE: 69 BPM | RESPIRATION RATE: 16 BRPM | TEMPERATURE: 98 F | SYSTOLIC BLOOD PRESSURE: 106 MMHG | BODY MASS INDEX: 29.71 KG/M2 | DIASTOLIC BLOOD PRESSURE: 71 MMHG | OXYGEN SATURATION: 97 % | HEIGHT: 64 IN

## 2020-01-18 PROCEDURE — 90715 TDAP VACCINE 7 YRS/> IM: CPT | Performed by: SPECIALIST

## 2020-01-18 PROCEDURE — 63600175 PHARM REV CODE 636 W HCPCS: Performed by: SPECIALIST

## 2020-01-18 PROCEDURE — 90471 IMMUNIZATION ADMIN: CPT | Performed by: SPECIALIST

## 2020-01-18 PROCEDURE — 90686 IIV4 VACC NO PRSV 0.5 ML IM: CPT | Performed by: SPECIALIST

## 2020-01-18 PROCEDURE — 90472 IMMUNIZATION ADMIN EACH ADD: CPT | Performed by: SPECIALIST

## 2020-01-18 PROCEDURE — 25000003 PHARM REV CODE 250: Performed by: SPECIALIST

## 2020-01-18 RX ADMIN — DOCUSATE SODIUM 200 MG: 100 CAPSULE, LIQUID FILLED ORAL at 10:01

## 2020-01-18 RX ADMIN — HYDROCORTISONE: 25 CREAM TOPICAL at 12:01

## 2020-01-18 RX ADMIN — INFLUENZA VIRUS VACCINE 0.5 ML: 15; 15; 15; 15 SUSPENSION INTRAMUSCULAR at 10:01

## 2020-01-18 RX ADMIN — IBUPROFEN 600 MG: 400 TABLET ORAL at 04:01

## 2020-01-18 RX ADMIN — CLOSTRIDIUM TETANI TOXOID ANTIGEN (FORMALDEHYDE INACTIVATED), CORYNEBACTERIUM DIPHTHERIAE TOXOID ANTIGEN (FORMALDEHYDE INACTIVATED), BORDETELLA PERTUSSIS TOXOID ANTIGEN (GLUTARALDEHYDE INACTIVATED), BORDETELLA PERTUSSIS FILAMENTOUS HEMAGGLUTININ ANTIGEN (FORMALDEHYDE INACTIVATED), BORDETELLA PERTUSSIS PERTACTIN ANTIGEN, AND BORDETELLA PERTUSSIS FIMBRIAE 2/3 ANTIGEN 0.5 ML: 5; 2; 2.5; 5; 3; 5 INJECTION, SUSPENSION INTRAMUSCULAR at 10:01

## 2020-01-18 NOTE — PLAN OF CARE
Pt with adequate pain control with po pain medication. Lochia minimal. Voiding without difficulty. VSS. Taken down for discharge via wheelchair accomplanied by nurse, NAD noted.

## 2020-01-18 NOTE — DISCHARGE INSTRUCTIONS
"SIGA CON BERNAL MÉDICO EN 4-6 SEMANAS O ANTES PARA CUALQUIER PROBLEMA.    [place] pélvico alden 6 semanas (sin sexo, tampones, douching, nada en la vagina)   Usted puede experimentar sangrado vaginal dentro y fuera de hasta 6 semanas, poco a poco se volverá más may y el color cambiará de melton brillante a lamont secreción marrón hacia el final.     Actividad:   NO hay actividades extenuantes ni ejercicio. No recoja/levante nada de más de 15 libras. No pollo tareas domésticas pesadas ni limpiando.   NO conducir alden 3 días. Puede hacer viajes cortos en coche, mello no conducir.   Usted puede ducharse y/o bañarse en lamont bañera, ambas son aceptables. Use un jabón suave, sin perfumes pesados ni fragancias para evitar la irritación.   Si se desarrolla estreñimiento: Puede franc Colace (ablandador de heces), Leche de Magnesia, Dulcolax o Miralax. Todos estos medicamentos se venden sin receta.     Cuidado de la episiotomía:   Agentes locales heather Tucks pads & Dermoplast spray. También puede usar un baño Sitz: sentarse en lamont bertin de agua tibia alden 15 minutos 2-3 veces al día ayudará a aliviar el malestar.     Alivio del dolor:   Puede franc Motrin para el dolor leve y los calambres uterinos.     Cambios emocionales:   Usted puede experimentar "baby blues" después del parto. Usted puede sentirse defraudado, ansioso y llorar fácilmente. [place] es normal. Estos sentimientos pueden comenzar 2-3 días después del parto y por lo general desaparecen en aproximadamente lamont o dos semanas. La tristeza prolongada puede indicar depresión posparto.     Llame a bernal médico para cualquiera de los siguientes:   Dificultad para respirar, problemas con cualquiera de leonardo medicamentos, incapacidad para comer.   Secreción vaginal con olor fétido.   Temperatura por encima de 100.4.   Sangrado vaginal intenso. Todas las mujeres sangran diferentes después del parto y cada parto es diferente. El sangrado intenso consiste en saturar lamont almohadilla " olamide en un período de tiempo de 1 hora. Los coágulos que pasan son normales, si pasas un coágulo de danni mayor que el tamaño de lamont pelota de golf, llama al consultorio de  médico.   Si experimenta dolor en las piernas/terneros, si lamont pierna aumenta de tamaño y se hincha o se calienta al tacto o se decolora.   Llorar o períodos de tristeza más allá de 2 semanas.     Si está amamantando:   Lávese los senos con jabón suave y agua tibia.   Deberías usar un sostén de apoyo.   Debe continuar tomando lamont vitamina prenatal alden 6 semanas o hasta que se interrumpa la lactancia materna.   Si los pezones están doloridos, aplica unas gotas de leche materna después de la lactancia y peyman que se seque al aire o puedes usar crema de Lanolin.   Si los senos están engordados, aplique calor y exprese la leche.   El consultor de lactancia [facility] está disponible al [phone] para leonardo necesidades de lactancia materna.    Si no está amamantando:   Use un sostén apretado y no estimule leonardo senos. Evite manipular leonardo senos y no exprese leche. Usted puede aplicar compresas de hielo o hojas de repollo para aliviar las molestias del engorgement. Si los senos se calientan al tacto, se enredan o se desarrollan bultos, llame al médico.              Instrucciones de descarga de lactancia materna       Servicios de apoyo a la lactancia materna de [facility] [phone]  ? Recomendación AAP de la ctancia materna exclusiva alden los primeros 6 meses de addie y lactancia materna continua con la introducción de alimentos suplementarios más allá del primer año de addie.  Instruido sobre la recomendación de retrasar todo el uso del biberón y chupete hasta después de las 4 semanas de edad y la lactancia materna está artem establecido.  Discutió los beneficios de la lactancia materna exclusiva tanto para la madre heather para el bebé.  Discutió los riesgos de la suplementación / uso de chupete en la exclusividad de la lactancia materna  "en los primeros 6 meses. Alimente al bebé lo antes posible de hambre o comodidad   o Hira a la boca, chupando movimientos   o Enraizar o buscar algo para chupar   o No esperes a llorar - no es lamont señal tardía de hambre; es un signo de angustia    ? Las alimentaciones pueden ser de 8-12 veces por 24 horas y no seguirán un horario  ? Alterna el pecho con el que comienzas la alimentación, o comienza con el pecho que se siente más completo  ? [place] los pechos cuando el bebé se jing del pecho o se queda dormido  ? Sigue ofreciendo pechos hasta que el bebé se javier lleno, ya no dé signos de hambre, y se queda dormido cuando se coloca boca a bernal espalda en la cuna  ? Si el bebé tiene sueño y no se despierta para alimentarse, ponga al bebé piel a piel vestido en un pañal contra el pecho desnudo de la madre  ? [place] cerca de tu bebé  ? El bebé debe colocarse y aferrarse correctamente a la mama   o "Pecho a pecho, barbilla en el pecho"   o Los labios del bebé están "volteados" hacia afuera   o La boca del bebé se estira abierta heather un grito   o La chupación del bebé debe tener ganas de tirando a la madre  ? El bebé debe beber en el pecho:  o Usted debe escuchar tragar o tragar a lo tai de la alimentación  o Deberías florin leche en los labios del bebé cuando sale del pecho  o Velma senos deben ser más suaves cuando el bebé haya terminado de alimentarse  o El bebé debe verse relajado al final de la alimentación  o Después del 4o día y bernal leche está en:   o La josette del bebé debe volverse de color amarillo brillante y ser suelto, acuoso y sórdido   o El bebé debe tener al menos 3-4 poops del tamaño de la arellano de la mano por día   o El bebé debe tener al menos 6-8 pañales húmedos por día   o La orina debe ser de color amarillo may  Usted debe beber cuando tenga sed y comer lamont dieta saludable cuando    hambriento.     [place] siestas para obtener el cyndi que necesita.   [place] medicamentos y/o shantel alcohol solo con el permiso de " bernal obstetra    o el pediatra del bebé.  También puede llamar al [facility] de [place] [employer],   ([phone]), de lunes a viernes, 8am-5pm Hora central, para obtener el anjali   información actualizada basada en evidencia sobre el uso de medicamentos alden   embarazo y lactancia.      El bebé debe ser examinado por un pediatra a los 3-5 días de edad; a menos que lo ordene antes el pediatra.  Maryanne vez que bernal leche entra, el bebé debe volver a bernal peso al nacer a más tardar 10-14 días de edad.    Si tiene problemas con la lactancia materna o tiene alguna pregunta con respecto a la lactancia materna, llame a los servicios de apoyo a la lactancia materna de [facility] [phone]

## 2020-01-18 NOTE — PROGRESS NOTES
"                                                                                     Postpartum Day 2 Vaginal Delivery    Tresa Cavazos is doing well without complaints. She denies any problems with pp blues, is ambulating well, is voiding without difficulty, and states bleeding is not heavy. Her pain level is being controlled with pain meds.      OBJECTIVE:     Vital Signs (Most Recent):  /83   Pulse 80   Temp 97.7 °F (36.5 °C) (Oral)   Resp 18   Ht 5' 4" (1.626 m)   Wt 78.9 kg (174 lb)   SpO2 98%   Breastfeeding? Yes   BMI 29.87 kg/m²       Vital Signs Range (Last 24H):  Reviewed    I & O (Last 24H):  Intake/Output Summary (Last 24 hours)    Intake/Output Summary (Last 24 hours) at 1/18/2020 0944  Last data filed at 1/18/2020 0301  Gross per 24 hour   Intake 1343 ml   Output --   Net 1343 ml         Physical Exam:    Abd: soft non tender, fundus firm   Ext: negative terrell's sign, minimal edema  Lochia: appropriate amount rubra      CBC:   Lab Results   Component Value Date/Time    WBC 12.82 (H) 01/17/2020 04:23 AM    RBC 4.01 01/17/2020 04:23 AM    HGB 11.7 (L) 01/17/2020 04:23 AM    HCT 35.6 (L) 01/17/2020 04:23 AM     01/17/2020 04:23 AM    MCV 89 01/17/2020 04:23 AM    MCH 29.2 01/17/2020 04:23 AM    MCHC 32.9 01/17/2020 04:23 AM       ABO/Rh  O POS     S/p vag delivery   Patient Active Problem List   Diagnosis    Encounter for induction of labor    Vaginal delivery          D/c home  See discharge orders  Follow up 4-6 weeks  Emergency room precautions  "